# Patient Record
Sex: MALE | URBAN - METROPOLITAN AREA
[De-identification: names, ages, dates, MRNs, and addresses within clinical notes are randomized per-mention and may not be internally consistent; named-entity substitution may affect disease eponyms.]

---

## 2023-03-12 ENCOUNTER — NURSE TRIAGE (OUTPATIENT)
Dept: NURSING | Facility: CLINIC | Age: 33
End: 2023-03-12

## 2023-03-13 NOTE — TELEPHONE ENCOUNTER
"Patient calling and writer needed to make new chart.  Patient declined to give phone number/address and then said he would call back tomorrow as it \"wasn't that serious.\"    Ronda Mcclain RN  [facility]      "